# Patient Record
Sex: FEMALE | Race: WHITE | NOT HISPANIC OR LATINO | Employment: FULL TIME
[De-identification: names, ages, dates, MRNs, and addresses within clinical notes are randomized per-mention and may not be internally consistent; named-entity substitution may affect disease eponyms.]

---

## 2017-08-05 ENCOUNTER — HEALTH MAINTENANCE LETTER (OUTPATIENT)
Age: 32
End: 2017-08-05

## 2022-08-12 ENCOUNTER — HOSPITAL ENCOUNTER (EMERGENCY)
Facility: CLINIC | Age: 37
Discharge: HOME | End: 2022-08-12
Attending: EMERGENCY MEDICINE | Admitting: EMERGENCY MEDICINE
Payer: COMMERCIAL

## 2022-08-12 ENCOUNTER — APPOINTMENT (OUTPATIENT)
Dept: CT IMAGING | Facility: CLINIC | Age: 37
End: 2022-08-12
Attending: EMERGENCY MEDICINE
Payer: COMMERCIAL

## 2022-08-12 VITALS
RESPIRATION RATE: 14 BRPM | BODY MASS INDEX: 24.33 KG/M2 | OXYGEN SATURATION: 98 % | HEART RATE: 75 BPM | WEIGHT: 155 LBS | SYSTOLIC BLOOD PRESSURE: 112 MMHG | HEIGHT: 67 IN | DIASTOLIC BLOOD PRESSURE: 72 MMHG | TEMPERATURE: 99.2 F
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
## 2022-08-12 DIAGNOSIS — S01.81XA LACERATION OF FOREHEAD, INITIAL ENCOUNTER: ICD-10-CM

## 2022-08-12 DIAGNOSIS — R55 SYNCOPE, UNSPECIFIED SYNCOPE TYPE: ICD-10-CM

## 2022-08-12 LAB — HCG UR QL: NEGATIVE

## 2022-08-12 PROCEDURE — 81025 URINE PREGNANCY TEST: CPT | Performed by: EMERGENCY MEDICINE

## 2022-08-12 PROCEDURE — 99284 EMERGENCY DEPT VISIT MOD MDM: CPT | Mod: 25 | Performed by: EMERGENCY MEDICINE

## 2022-08-12 PROCEDURE — 12013 RPR F/E/E/N/L/M 2.6-5.0 CM: CPT | Performed by: EMERGENCY MEDICINE

## 2022-08-12 PROCEDURE — 70450 CT HEAD/BRAIN W/O DYE: CPT

## 2022-08-12 PROCEDURE — 250N000013 HC RX MED GY IP 250 OP 250 PS 637: Performed by: EMERGENCY MEDICINE

## 2022-08-12 RX ORDER — ACETAMINOPHEN 500 MG
1000 TABLET ORAL ONCE
Status: COMPLETED | OUTPATIENT
Start: 2022-08-12 | End: 2022-08-12

## 2022-08-12 RX ORDER — METHYLCELLULOSE 4000CPS 30 %
POWDER (GRAM) MISCELLANEOUS ONCE
Status: DISCONTINUED | OUTPATIENT
Start: 2022-08-12 | End: 2022-08-12 | Stop reason: CLARIF

## 2022-08-12 RX ADMIN — ACETAMINOPHEN 1000 MG: 500 TABLET, FILM COATED ORAL at 21:25

## 2022-08-12 ASSESSMENT — ACTIVITIES OF DAILY LIVING (ADL)
ADLS_ACUITY_SCORE: 35
ADLS_ACUITY_SCORE: 35

## 2022-08-13 NOTE — DISCHARGE INSTRUCTIONS
Please keep the wound clean and dry for 24-48 hours. The affected area should be kept elevated as much as possible. The glue will dissolve on its own over the next 5-7 days, do not put anything over the glue. Please return to the ER or to primary care physician immediately if you develop warmth, redness, swelling, drainage from the wound or have fevers.   Any time the skin is damaged, scar formation is unavoidable. You can minimize the scar by following the instructions listed above, and by preventing infection. The scar will continue to change for at least 1 year, and the final appearance of the scar will not be known until at least that time. One way to minimize scar formation is to apply sun screen to the scar before any sun exposure for 12 months following wound repair, as sunburn or even tanning may cause the scar to become discolored.

## 2022-08-13 NOTE — ED PROVIDER NOTES
"  History     Chief Complaint   Patient presents with     Fall     Fall on stairs at home PTA. Laceration between eyebrows. Vomiting. Denies LOC.      HPI  Nevaeh Hernandez is a 36 year old female who presents for evaluation after a fall down stairs.  The patient says that shortly prior to arrival she was making dinner when she started to feel lightheaded.  She cleaned up dinner and was going downstairs when she had more darkening of her vision and unfortunately had a loss of consciousness.  She fell down the stairs.  She remembers being at the bottom but does not remember the fall.  She landed on her face and is complaining of headache and pain between the eyebrows.  Shortly afterwards she became nauseated and she vomited once.  She denies any neck pain.  She has not taking thing for pain.  She denies any chest pain, shortness of breath, abdominal pain, diarrhea, bloody stools, or focal numbness or weakness.  Last menstrual period was about 30 days ago.    Allergies:  No Known Allergies    Problem List:    There are no problems to display for this patient.       Past Medical History:    No past medical history on file.    Past Surgical History:    No past surgical history on file.    Family History:    Family History   Problem Relation Age of Onset     Lipids Mother      Lipids Father        Social History:  Marital Status:  Single [1]  Social History     Tobacco Use     Smoking status: Never Smoker   Substance Use Topics     Alcohol use: Yes        Medications:    benzonatate (TESSALON) 200 MG capsule  HYDROcodone-acetaminophen (NORCO) 5-325 MG per tablet  norgestimate-ethinyl estradiol (SPRINTEC 28) 0.25-35 MG-MCG tablet  PARoxetine (PAXIL) 20 MG tablet          Review of Systems  Pertinent positives and negatives listed in the HPI, all other systems reviewed and are negative.    Physical Exam   BP: 98/71  Pulse: 83  Temp: 99.2  F (37.3  C)  Resp: 14  Height: 170.2 cm (5' 7\")  Weight: 70.3 kg (155 lb)  SpO2: 98 " "%      Physical Exam  /72   Pulse 75   Temp 99.2  F (37.3  C) (Oral)   Resp 14   Ht 1.702 m (5' 7\")   Wt 70.3 kg (155 lb)   SpO2 98%   BMI 24.28 kg/m     GENERAL: Alert, cooperative, mild distress  HEAD: Laceration of the forehead to the medial aspect of the left eyebrow.  No crepitus.  EYES: Conjunctivae clear, EOM intact. PERLL, Pupils are 3 mm.  HEENT:  Normal external ears, normal TM's without evidence of hemotympanum.  No nasal discharge or evidence of septal hematoma. No facial instability or asymmetry. No evidence of intraoral trauma.  Intact bite without malalignment.  NECK: Full painless range of motion.  No midline tenderness, no lateral tenderness.  CHEST: No crepitus or tenderness with AP or lateral compression  CARDIOVASCULAR : Nml rate, distal pulses are normal and symmetric  LUNGS: No respiratory distress.  GI: Soft, ND, NT.   PELVIS: No crepitus or tenderness with AP or lateral compression  BACK: No step-offs palpated, no tenderness to palpation of thoracic or lumbar spine.  EXTREMITIES: No obvious deformities, no tenderness to palpation.  Moving all extremities equally  NEUROLOGICAL : GCS= 15.  Awake, alert, and oriented x 3.  Cranial nerves II-XII grossly intact. Normal muscle strength and tone, sensation to light touch grossly intact in all extremities.  No focal deficits.   SKIN : Normal color, no jaundice or rash.  No abrasions.      ED [facility]    -Laceration Repair    Date/Time: 8/13/2022 4:01 AM  Performed by: Fan Reyes MD  Authorized by: Fan Reyes MD     Risks, benefits and alternatives discussed.      ANESTHESIA (see MAR for exact dosages):     Anesthesia method:  Topical application    Topical anesthetic:  LET  LACERATION DETAILS     Location:  Face    Face location:  Forehead    Length (cm):  4    REPAIR TYPE:     Repair type:  Intermediate      EXPLORATION:     Wound exploration: wound explored " through full range of motion and entire depth of wound probed and visualized      TREATMENT:     Area cleansed with:  Soap and water    Amount of cleaning:  Standard    SUBCUTANEOUS REPAIR     Suture size:  5-0    Suture material:  Vicryl    Suture technique:  Simple interrupted    Number of sutures:  4    SKIN REPAIR     Repair method:  Tissue adhesive    APPROXIMATION     Approximation:  Close    PROCEDURE    Patient Tolerance:  Patient tolerated the procedure well with no immediate complications                Critical Care time:  none               Results for orders placed or performed during the hospital encounter of 08/12/22 (from the past 24 hour(s))   HCG qualitative urine   Result Value Ref Range    hCG Urine Qualitative Negative Negative   CT Head w/o Contrast    Narrative    EXAM: CT HEAD W/O CONTRAST  LOCATION: [facility]  DATE/TIME: 8/12/2022 10:19 PM    INDICATION: Headache; Trauma, acute subacute, without suspected cervical artery trauma  COMPARISON: None.  TECHNIQUE: Routine CT Head without IV contrast. Multiplanar reformats. Dose reduction techniques were used.    FINDINGS:  INTRACRANIAL CONTENTS: No intracranial hemorrhage, extraaxial collection, or mass effect.  No CT evidence of acute infarct. Normal parenchymal attenuation. Normal ventricles and sulci.     VISUALIZED ORBITS/SINUSES/MASTOIDS: No intraorbital abnormality. No paranasal sinus mucosal disease. No middle ear or mastoid effusion.    BONES/SOFT TISSUES: No acute abnormality.       Impression    IMPRESSION:  1.  No CT finding of a mass, hemorrhage or focal area suggestive of acute infarct.       Medications   acetaminophen (TYLENOL) tablet 1,000 mg (1,000 mg Oral Given 8/12/22 2125)       Assessments & Plan (with Medical Decision Making)   36-year-old female presents for evaluation for fall downstairs after a syncopal episode.  Temperature is 37.3  C, heart rate is 83, SPO2 is 98% on room air, blood pressure  is 98/71.  CT of the head obtained, images reviewed independently as well as radiology read reviewed, no signs of intracranial hemorrhage or mass.  I have ordered an EKG but unfortunately this was not completed and I missed this prior to her discharge.  She is getting of the acetaminophen for the headache with improvement.  Laceration repaired as above.  She was discharged with instructions to return if she has worsening of her symptoms or other concerns, otherwise follow-up in clinic.  The patient is in agreement with this plan.    I have reviewed the nursing notes.    I have reviewed the findings, diagnosis, plan and need for follow up with the patient.       Discharge Medication List as of 8/12/2022 11:01 PM          Final diagnoses:   Syncope, unspecified syncope type   Fall down stairs, initial encounter   Laceration of forehead, initial encounter       8/12/2022   [facility] EMERGENCY DEPT     Fan Reyes MD  08/13/22 0407

## 2022-08-13 NOTE — ED TRIAGE NOTES
Pt fell down the stairs about 30 -40 min ago, 6 carpeted steps, falling into a metal gate at the bottom.  No loc.      Large gash to L eye, pt is nauseated and gaging.  Pt denies neck pain, no midline tenderness.